# Patient Record
Sex: FEMALE | URBAN - METROPOLITAN AREA
[De-identification: names, ages, dates, MRNs, and addresses within clinical notes are randomized per-mention and may not be internally consistent; named-entity substitution may affect disease eponyms.]

---

## 2024-09-23 ENCOUNTER — APPOINTMENT (RX ONLY)
Dept: URBAN - METROPOLITAN AREA CLINIC 55 | Facility: CLINIC | Age: 34
Setting detail: DERMATOLOGY
End: 2024-09-23

## 2024-09-23 DIAGNOSIS — Z41.9 ENCOUNTER FOR PROCEDURE FOR PURPOSES OTHER THAN REMEDYING HEALTH STATE, UNSPECIFIED: ICD-10-CM

## 2024-09-23 PROCEDURE — ? DYSPORT

## 2024-09-23 PROCEDURE — ? INVENTORY

## 2024-09-23 NOTE — PROCEDURE: DYSPORT
Additional Area 3 Units: 0
Periorbital Skin Units: 20
Show Depressor Anguli Units: Yes
Post-Care Instructions: Patient instructed to not lie down flat for 4 hours or rub the treatment area.
Show Right And Left Periorbital Units: No
Dilution (U/0.1 Cc): 10
Detail Level: Detailed
Additional Area 1 Location: upper cutaneous
Show Inventory Tab: Show
Glabellar Complex Units: 50
Additional Area 2 Location: chin
Consent obtained and risk reviewed.

## 2025-01-15 ENCOUNTER — APPOINTMENT (OUTPATIENT)
Dept: URBAN - METROPOLITAN AREA CLINIC 55 | Facility: CLINIC | Age: 35
Setting detail: DERMATOLOGY
End: 2025-01-15

## 2025-01-15 DIAGNOSIS — Z41.9 ENCOUNTER FOR PROCEDURE FOR PURPOSES OTHER THAN REMEDYING HEALTH STATE, UNSPECIFIED: ICD-10-CM

## 2025-01-15 PROCEDURE — ? DYSPORT

## 2025-01-15 PROCEDURE — ? COSMETIC CONSULTATION: GENERAL

## 2025-01-15 PROCEDURE — ? INVENTORY

## 2025-01-15 NOTE — PROCEDURE: DYSPORT
[place] Units: 0
Show Right And Left Periorbital Units: No
Post-Care Instructions: Patient instructed to not lie down flat for 4 hours or rub the treatment area.
Show Anterior Platysmal Band Units: Yes
Dilution (U/0.1 Cc): 10
Periorbital Skin Units: 20
Detail Level: Detailed
Glabellar Complex Units: 50
Additional Area 1 Location: upper lip cutaneous
Additional Area 2 Location: chin
Show Inventory Tab: Show
Consent obtained and risk reviewed.

## 2025-05-14 ENCOUNTER — APPOINTMENT (OUTPATIENT)
Dept: URBAN - METROPOLITAN AREA CLINIC 55 | Facility: CLINIC | Age: 35
Setting detail: DERMATOLOGY
End: 2025-05-14

## 2025-05-14 DIAGNOSIS — Z41.9 ENCOUNTER FOR PROCEDURE FOR PURPOSES OTHER THAN REMEDYING HEALTH STATE, UNSPECIFIED: ICD-10-CM

## 2025-05-14 PROCEDURE — ? DYSPORT
